# Patient Record
Sex: MALE | Race: WHITE | URBAN - METROPOLITAN AREA
[De-identification: names, ages, dates, MRNs, and addresses within clinical notes are randomized per-mention and may not be internally consistent; named-entity substitution may affect disease eponyms.]

---

## 2021-11-09 ENCOUNTER — APPOINTMENT (OUTPATIENT)
Dept: URBAN - METROPOLITAN AREA SURGERY 9 | Age: 61
Setting detail: DERMATOLOGY
End: 2021-11-09

## 2021-11-09 DIAGNOSIS — L72.0 EPIDERMAL CYST: ICD-10-CM

## 2021-11-09 PROCEDURE — OTHER INTRALESIONAL KENALOG: OTHER

## 2021-11-09 PROCEDURE — OTHER OTHER: OTHER

## 2021-11-09 PROCEDURE — OTHER COUNSELING: OTHER

## 2021-11-09 PROCEDURE — 11900 INJECT SKIN LESIONS </W 7: CPT

## 2021-11-09 ASSESSMENT — LOCATION SIMPLE DESCRIPTION DERM: LOCATION SIMPLE: POSTERIOR NECK

## 2021-11-09 ASSESSMENT — LOCATION DETAILED DESCRIPTION DERM: LOCATION DETAILED: MID POSTERIOR NECK

## 2021-11-09 ASSESSMENT — LOCATION ZONE DERM: LOCATION ZONE: NECK

## 2021-11-09 NOTE — PROCEDURE: OTHER
Other (Free Text): Warm compresses.\\nThis has been present for 1 month.\\nWife took a needle to it and it got worse.  He had previously been on keflex for 2.5 weeks prior to this without any improvement. I do not believe further antibx would be of benefit over the ILK.  It is not ready to be decompressed as of yet. Close follow up.
Detail Level: Simple
Render Risk Assessment In Note?: no
Note Text (......Xxx Chief Complaint.): This diagnosis correlates with the